# Patient Record
Sex: MALE | Race: WHITE | URBAN - METROPOLITAN AREA
[De-identification: names, ages, dates, MRNs, and addresses within clinical notes are randomized per-mention and may not be internally consistent; named-entity substitution may affect disease eponyms.]

---

## 2019-04-15 ENCOUNTER — OFFICE VISIT (OUTPATIENT)
Dept: FAMILY MEDICINE CLINIC | Facility: CLINIC | Age: 34
End: 2019-04-15

## 2019-04-15 VITALS
WEIGHT: 188 LBS | SYSTOLIC BLOOD PRESSURE: 149 MMHG | DIASTOLIC BLOOD PRESSURE: 78 MMHG | BODY MASS INDEX: 27 KG/M2 | HEART RATE: 87 BPM

## 2019-04-15 DIAGNOSIS — M99.01 CERVICAL (NECK) REGION SOMATIC DYSFUNCTION: Primary | ICD-10-CM

## 2019-04-15 DIAGNOSIS — R07.81 RIB PAIN ON RIGHT SIDE: ICD-10-CM

## 2019-04-15 PROCEDURE — 98925 OSTEOPATH MANJ 1-2 REGIONS: CPT | Performed by: FAMILY MEDICINE

## 2019-04-15 PROCEDURE — 99212 OFFICE O/P EST SF 10 MIN: CPT | Performed by: FAMILY MEDICINE

## 2019-04-15 PROCEDURE — 99213 OFFICE O/P EST LOW 20 MIN: CPT | Performed by: FAMILY MEDICINE

## 2019-04-15 RX ORDER — DICLOFENAC SODIUM 75 MG/1
75 TABLET, DELAYED RELEASE ORAL 2 TIMES DAILY
Qty: 60 TABLET | Refills: 1 | Status: SHIPPED | OUTPATIENT
Start: 2019-04-15 | End: 2020-11-21

## 2019-04-15 NOTE — PROGRESS NOTES
HPI:    Patient ID: Magen Romero is a 28year old male. HPI  Patient presents with:   Injury: pt injured neck when wrestling 3 weeks ago  Trauma (cardiovascular, musculoskeletal): pt injured right side rib 5 months ago and having pain     Review of Syst

## 2019-04-15 NOTE — PROCEDURES
Left upper cervicals and right mid to upper thoracic somatic dysfunction treated today with OMT. Using soft tissue and HVLA. Tolerated well and improved function.

## 2020-11-21 ENCOUNTER — OFFICE VISIT (OUTPATIENT)
Dept: FAMILY MEDICINE CLINIC | Facility: CLINIC | Age: 35
End: 2020-11-21

## 2020-11-21 VITALS
HEART RATE: 77 BPM | HEIGHT: 70 IN | DIASTOLIC BLOOD PRESSURE: 69 MMHG | SYSTOLIC BLOOD PRESSURE: 106 MMHG | WEIGHT: 188 LBS | BODY MASS INDEX: 26.92 KG/M2

## 2020-11-21 DIAGNOSIS — Z00.00 ROUTINE GENERAL MEDICAL EXAMINATION AT A HEALTH CARE FACILITY: Primary | ICD-10-CM

## 2020-11-21 PROCEDURE — 99395 PREV VISIT EST AGE 18-39: CPT | Performed by: FAMILY MEDICINE

## 2020-11-21 PROCEDURE — 3078F DIAST BP <80 MM HG: CPT | Performed by: FAMILY MEDICINE

## 2020-11-21 PROCEDURE — 3008F BODY MASS INDEX DOCD: CPT | Performed by: FAMILY MEDICINE

## 2020-11-21 PROCEDURE — 3074F SYST BP LT 130 MM HG: CPT | Performed by: FAMILY MEDICINE

## 2020-11-21 RX ORDER — MULTIVIT WITH MINERALS/LUTEIN
1000 TABLET ORAL DAILY
COMMUNITY

## 2020-11-21 RX ORDER — MULTIVIT-MIN/IRON/FOLIC ACID/K 18-600-40
CAPSULE ORAL
COMMUNITY

## 2020-11-21 NOTE — PROGRESS NOTES
HPI:    Patient ID: Patsy Chi is a 38year old male. HPI  Patient presents with:  Physical: health screening form     Review of Systems   Constitutional: Negative. HENT: Negative. Eyes: Negative. Respiratory: Negative.     Cardiovascular: Ne With Platelet      Comp Metabolic Panel (14) [E]      Lipid Panel [E]      Meds This Visit:  Requested Prescriptions      No prescriptions requested or ordered in this encounter       Imaging & Referrals:  None       BE#[id number]

## 2020-11-23 ENCOUNTER — LAB ENCOUNTER (OUTPATIENT)
Dept: LAB | Age: 35
End: 2020-11-23
Attending: FAMILY MEDICINE
Payer: COMMERCIAL

## 2020-11-23 DIAGNOSIS — Z00.00 ROUTINE GENERAL MEDICAL EXAMINATION AT A HEALTH CARE FACILITY: ICD-10-CM

## 2020-11-23 PROCEDURE — 85025 COMPLETE CBC W/AUTO DIFF WBC: CPT

## 2020-11-23 PROCEDURE — 80053 COMPREHEN METABOLIC PANEL: CPT

## 2020-11-23 PROCEDURE — 36415 COLL VENOUS BLD VENIPUNCTURE: CPT

## 2020-11-23 PROCEDURE — 80061 LIPID PANEL: CPT

## 2020-12-30 ENCOUNTER — TELEPHONE (OUTPATIENT)
Dept: FAMILY MEDICINE CLINIC | Facility: CLINIC | Age: 35
End: 2020-12-30

## 2023-08-11 ENCOUNTER — OFFICE VISIT (OUTPATIENT)
Dept: FAMILY MEDICINE CLINIC | Facility: CLINIC | Age: 38
End: 2023-08-11

## 2023-08-11 ENCOUNTER — HOSPITAL ENCOUNTER (OUTPATIENT)
Dept: GENERAL RADIOLOGY | Age: 38
Discharge: HOME | End: 2023-08-11
Attending: FAMILY MEDICINE
Payer: COMMERCIAL

## 2023-08-11 VITALS
WEIGHT: 179.81 LBS | DIASTOLIC BLOOD PRESSURE: 73 MMHG | OXYGEN SATURATION: 97 % | HEART RATE: 79 BPM | HEIGHT: 69.8 IN | BODY MASS INDEX: 26.03 KG/M2 | SYSTOLIC BLOOD PRESSURE: 127 MMHG | TEMPERATURE: 98 F

## 2023-08-11 DIAGNOSIS — M54.12 CERVICAL RADICULOPATHY: ICD-10-CM

## 2023-08-11 DIAGNOSIS — M54.12 CERVICAL RADICULOPATHY: Primary | ICD-10-CM

## 2023-08-11 PROCEDURE — 3078F DIAST BP <80 MM HG: CPT | Performed by: FAMILY MEDICINE

## 2023-08-11 PROCEDURE — 3008F BODY MASS INDEX DOCD: CPT | Performed by: FAMILY MEDICINE

## 2023-08-11 PROCEDURE — 99214 OFFICE O/P EST MOD 30 MIN: CPT | Performed by: FAMILY MEDICINE

## 2023-08-11 PROCEDURE — 3074F SYST BP LT 130 MM HG: CPT | Performed by: FAMILY MEDICINE

## 2023-08-11 PROCEDURE — 72050 X-RAY EXAM NECK SPINE 4/5VWS: CPT | Performed by: FAMILY MEDICINE

## 2023-08-11 RX ORDER — METHYLPREDNISOLONE 4 MG/1
TABLET ORAL
Qty: 1 EACH | Refills: 0 | Status: SHIPPED | OUTPATIENT
Start: 2023-08-11

## 2024-07-19 ENCOUNTER — OFFICE VISIT (OUTPATIENT)
Dept: FAMILY MEDICINE CLINIC | Facility: CLINIC | Age: 39
End: 2024-07-19
Payer: COMMERCIAL

## 2024-07-19 VITALS
HEIGHT: 69.8 IN | RESPIRATION RATE: 16 BRPM | TEMPERATURE: 98 F | WEIGHT: 178 LBS | OXYGEN SATURATION: 98 % | SYSTOLIC BLOOD PRESSURE: 124 MMHG | BODY MASS INDEX: 25.77 KG/M2 | HEART RATE: 73 BPM | DIASTOLIC BLOOD PRESSURE: 62 MMHG

## 2024-07-19 DIAGNOSIS — L08.9 LOCALIZED INFECTION OF SKIN: Primary | ICD-10-CM

## 2024-07-19 PROCEDURE — 3078F DIAST BP <80 MM HG: CPT | Performed by: NURSE PRACTITIONER

## 2024-07-19 PROCEDURE — 3074F SYST BP LT 130 MM HG: CPT | Performed by: NURSE PRACTITIONER

## 2024-07-19 PROCEDURE — 3008F BODY MASS INDEX DOCD: CPT | Performed by: NURSE PRACTITIONER

## 2024-07-19 PROCEDURE — 99203 OFFICE O/P NEW LOW 30 MIN: CPT | Performed by: NURSE PRACTITIONER

## 2024-07-19 RX ORDER — CEFADROXIL 500 MG/1
500 CAPSULE ORAL 2 TIMES DAILY
Qty: 14 CAPSULE | Refills: 0 | Status: SHIPPED | OUTPATIENT
Start: 2024-07-19 | End: 2024-07-26

## 2024-07-19 NOTE — PROGRESS NOTES
CHIEF COMPLAINT:     Chief Complaint   Patient presents with    Derm Problem     Sx 1 week - Thought he was bit by something on back of R knee  Sx yesterday - Noticed back of R knee is swollen, warm to touch, stiff  Denies drainage, loss of feeling  OTC tea tree oil cream         HPI:   Masood Perera is a 38 year old male who presents with possible insect bite approx 1 week ago while in his backyard. Today noticed back of knee is swollen, warm to touch, and stiff. No fevers or chills.     Was itching and now more mild itch. No pain.     Pt denies any leg injury.     Pt denies any prolonged immobilization. No recent airplane trips. No clotting disorders. No SOB or chest pain.     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     Tx: using tea tree oil.     Current Outpatient Medications   Medication Sig Dispense Refill    cefadroxil 500 MG Oral Cap Take 1 capsule (500 mg total) by mouth 2 (two) times daily for 7 days. 14 capsule 0    Ascorbic Acid (VITAMIN C) 1000 MG Oral Tab Take 1 tablet (1,000 mg total) by mouth daily.      Cholecalciferol (VITAMIN D) 50 MCG (2000 UT) Oral Cap Take by mouth.      ZINC-MAGNESIUM ASPART-VIT B6 OR Take by mouth.        History reviewed. No pertinent past medical history.   Social History:  Social History     Socioeconomic History    Marital status:    Tobacco Use    Smoking status: Never     Passive exposure: Never    Smokeless tobacco: Never   Vaping Use    Vaping status: Never Used   Substance and Sexual Activity    Alcohol use: Yes     Comment: very rare    Drug use: No   Other Topics Concern    Caffeine Concern Yes     Comment: Tea        REVIEW OF SYSTEMS:   GENERAL: Denies fever or chills.   SKIN: see hpi  CHEST: Denies chest pain, or palpitations  LUNGS: Denies shortness of breath, cough, or wheezing  MUSCULOSKELETAL: no arthralgia or swollen joints  LYMPH:  Denies lymphadenopathy  NEURO: Denies headaches or lightheadedness      EXAM:   /62    Pulse 73   Temp 97.9 °F (36.6 °C)   Resp 16   Ht 5' 9.8\" (1.773 m)   Wt 178 lb (80.7 kg)   SpO2 98%   BMI 25.69 kg/m²   GENERAL: well developed, well nourished,in no apparent distress  SKIN: Mild erythema and swelling noted on right popliteal fossa. No pain with palpitation. No drainage to area.   HEAD: atraumatic, normocephalic  EYES: conjunctiva clear,  NECK: supple, non-tender  LUNGS: clear to auscultation bilaterally, no wheezes or rhonchi. Breathing is non labored.  CARDIO: RRR without murmur  EXTREMITIES: Right leg - DP 2+, Negative gisel's sign.  Anterior knee without swelling, erythema,  or pain on palpitation.   LYMPH:  no lymphadenopathy.    Neuro: Sensation intact. Gait steady.         ASSESSMENT AND PLAN:     ASSESSMENT:  Encounter Diagnosis   Name Primary?    Localized infection of skin Yes       PLAN:    Meds:  - cefadroxil 500 MG Oral Cap; Take 1 capsule (500 mg total) by mouth 2 (two) times daily for 7 days.  Dispense: 14 capsule; Refill: 0      Pt has been itching area since irritation and has had multiple exposure to a wrestling mat. Will start cefadroxil as directed.     Recommended to wash area with soap and water. May apply bacitician to the back of his leg and hydrocortisone as well. Cool compress as well.     Recommended Claritin or zyrtec daily.     Discussed with patient if swelling or redness increases or if he develops leg pain, chest pain, or SOB he should follow up immediately at the ER.     Discussed s/s of worsening infection/condition with Patient and importance of prompt medical re-evaluation including when to seek emergency care. Patient  voiced understanding    May consider OTC tylenol or ibuprofen as needed and directed on packaging for pain/fever    Risks, benefits, and side effects of medication discussed. Patient  verbalized understanding and agreement with treatment plan.     All questions and concerns addressed. Encouraged Patient  to call clinic with any questions or  concerns. I explained to the patient that emergent conditions may arise and to go to the ER for new, worsening or any persistent conditions.      Follow up if symptoms do not improve in 1-2 days or sooner if symptoms dramatically worse.   Patient Instructions   Remember, you develop increased redness, swelling, pain, fever, or chills to seek emergency care.     Also if you develop any chest pain or shortness of breath you should seek emergency care.     You may wash the area with soap and water and apply bacitracin to the back of your knee. You may also use hydrocortisone twice a day for up to 2 weeks.     Recommend a Claritin or zyrtec daily.     See attached patient care instructions.           The patient indicates understanding of these issues and agrees to the plan.

## 2024-07-20 NOTE — PATIENT INSTRUCTIONS
Remember, you develop increased redness, swelling, pain, fever, or chills to seek emergency care.     Also if you develop any chest pain or shortness of breath you should seek emergency care.     You may wash the area with soap and water and apply bacitracin to the back of your knee. You may also use hydrocortisone twice a day for up to 2 weeks.     Recommend a Claritin or zyrtec daily.     See attached patient care instructions.

## 2024-10-22 ENCOUNTER — TELEMEDICINE (OUTPATIENT)
Dept: TELEHEALTH | Age: 39
End: 2024-10-22
Payer: COMMERCIAL

## 2024-10-22 ENCOUNTER — E-VISIT (OUTPATIENT)
Dept: TELEHEALTH | Age: 39
End: 2024-10-22
Payer: COMMERCIAL

## 2024-10-22 DIAGNOSIS — R21 RASH: Primary | ICD-10-CM

## 2024-10-22 DIAGNOSIS — Z02.9 ADMINISTRATIVE ENCOUNTER: Primary | ICD-10-CM

## 2024-10-22 PROCEDURE — 99213 OFFICE O/P EST LOW 20 MIN: CPT | Performed by: NURSE PRACTITIONER

## 2024-10-22 RX ORDER — MUPIROCIN 20 MG/G
1 OINTMENT TOPICAL 3 TIMES DAILY
Qty: 22 G | Refills: 0 | Status: SHIPPED | OUTPATIENT
Start: 2024-10-22 | End: 2024-10-29

## 2024-10-22 NOTE — PROGRESS NOTES
Pt submitted e-visit for arm rash.  Requested pt change to video visit instead d/t further eval needed.   No appropriate e-visit questionnaire for pt's symptoms.   Pt agreeable; video visit completed.   No e-visit charge

## 2024-10-22 NOTE — PROGRESS NOTES
Virtual/Telephone Check-In    Msaood Perera verbally consents to a Virtual/Telephone Check-In service on 10/22/24.  Patient has been referred to the [facility] website at [email or web address] to review the yearly Consent to Treat document.  Patient understands and accepts financial responsibility for any deductible, co-insurance and/or co-pays associated with this service.       Telehealth Verbal Consent   I conducted a telehealth visit with Masood Perera today, 10/22/24, which was completed using two-way, real-time interactive audio and video communication. This has been done in good nikita to provide continuity of care in the best interest of the provider-patient relationship, due to the COVID - public health crisis/national emergency where restrictions of face-to-face office visits are ongoing. Every conscious effort was taken to allow for sufficient and adequate time to complete the visit.  The patient was made aware of the limitations of the telehealth visit, including treatment limitations as no physical exam could be performed.  The patient was advised to call 911 or to go to the ER in case there was an emergency.  The patient was also advised of the potential privacy & security concerns related to the telehealth platform.   The patient was made aware of where to find [facility]'s notice of privacy practices, telehealth consent form and other related consent forms and documents.  which are located on the [facility] website. The patient verbally agreed to telehealth consent form, related consents and the risks discussed.    Lastly, the patient confirmed that they were in [place].   Included in this visit, time may have been spent reviewing labs, medications, radiology tests and decision making. Appropriate medical decision-making and tests are ordered as detailed in the plan of care above.  Coding/billing information is submitted for this visit based on complexity of care and/or time spent for the visit.    CHIEF COMPLAINT:      Chief Complaint   Patient presents with    Derm Problem       HPI:   Masood Perera is a 39 year old male who presents for a video visit.  Patient reports rash.    Per patient rash started 1 week ago on left forearm. Pt thought he scraped it or had insect bite.  Initial lesion healed with neosporin but then developed 3 more lesions.    Reports they look like a friction burn initially and skin looks rubbed off small areas. Reports mild redness of lesions; yellow/honey colored drainage/crusting.   Reports minimal itch which he feels is d/t bandage; no pain/tenderness.  Treating with neosporin and lotrimin; covering with bandage.      Pt coaches wrestling.  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  Just prescribed antibiotic ointment today - believes for impetigo.    Denies fever, streaking of wound, or other signs of systemic illness.     Current Outpatient Medications   Medication Sig Dispense Refill    Ascorbic Acid (VITAMIN C) 1000 MG Oral Tab Take 1 tablet (1,000 mg total) by mouth daily.      Cholecalciferol (VITAMIN D) 50 MCG (2000 UT) Oral Cap Take by mouth.      ZINC-MAGNESIUM ASPART-VIT B6 OR Take by mouth.        No past medical history on file.   Past Surgical History:   Procedure Laterality Date    Hernia surgery  2002         Social History     Socioeconomic History    Marital status:    Tobacco Use    Smoking status: Never     Passive exposure: Never    Smokeless tobacco: Never   Vaping Use    Vaping status: Never Used   Substance and Sexual Activity    Alcohol use: Yes     Comment: very rare    Drug use: No   Other Topics Concern    Caffeine Concern Yes     Comment: Tea         REVIEW OF SYSTEMS:   GENERAL: normal appetite  SKIN: see HPI  LUNGS: no shortness of breath or wheezing, See HPI  CARDIOVASCULAR: no chest pain or palpitations     EXAM:   General: Alert, Well-appearing, and In no acute distress  Respiratory:   Speaking in full sentences comfortably  Normal work of  breathing  No cough during visit  Head: Normocephalic  Nose: No obvious nasal discharge.  Skin: Rash/lesion(s): left forearm with 4 small lesions in one localized area of arm - 2 appear closed/healed, 2 with small open mildly erythematous areas - pt reports smaller in size than pencil eraser tip.  No drainage visibile;  denies tenderness  Mood: Affect appropriate      ASSESSMENT AND PLAN:   Masood Perera is a 39 year old male who presents with symptoms that are consistent with    ASSESSMENT/PLAN:     Diagnoses and all orders for this visit:    Rash  -     mupirocin 2 % External Ointment; Apply 1 Application topically 3 (three) times daily for 7 days.    Likely impetigo based on pt's description of drainage/crusting.   Pt's child likely has impetigo as well and pt is a .   Will treat with medication as listed  Discussed use, dose, and possible side effects  Skin care discussed and prevention of spread  See pt instructions      Patient Instructions   Clean area with soap and water daily  Apply mupirocin ointment with a glove or q-tip  Cover with bandage.   Follow up with your PCP if no improvement in 3 days or sooner for new or worsening symptoms.      The patient indicates understanding of these issues and agrees to the plan.    Face to face time spent on Video Visit: 7:50 min  Total Time spent on visit including reviewing history, ordering labs/medication, patient examination and education: 10 min

## 2024-10-22 NOTE — PATIENT INSTRUCTIONS
Clean area with soap and water daily  Apply mupirocin ointment with a glove or q-tip  Cover with bandage.   Follow up with your PCP if no improvement in 3 days or sooner for new or worsening symptoms.